# Patient Record
Sex: FEMALE | Race: WHITE
[De-identification: names, ages, dates, MRNs, and addresses within clinical notes are randomized per-mention and may not be internally consistent; named-entity substitution may affect disease eponyms.]

---

## 2018-03-21 ENCOUNTER — HOSPITAL ENCOUNTER (EMERGENCY)
Dept: HOSPITAL 45 - C.EDB | Age: 23
Discharge: HOME | End: 2018-03-21
Payer: COMMERCIAL

## 2018-03-21 VITALS — OXYGEN SATURATION: 98 % | SYSTOLIC BLOOD PRESSURE: 120 MMHG | DIASTOLIC BLOOD PRESSURE: 87 MMHG | HEART RATE: 68 BPM

## 2018-03-21 VITALS
WEIGHT: 171.3 LBS | BODY MASS INDEX: 25.96 KG/M2 | BODY MASS INDEX: 25.96 KG/M2 | WEIGHT: 171.3 LBS | HEIGHT: 67.99 IN | HEIGHT: 67.99 IN

## 2018-03-21 VITALS — TEMPERATURE: 97.88 F

## 2018-03-21 DIAGNOSIS — R10.9: ICD-10-CM

## 2018-03-21 DIAGNOSIS — Z79.3: ICD-10-CM

## 2018-03-21 DIAGNOSIS — M54.5: Primary | ICD-10-CM

## 2018-03-21 DIAGNOSIS — K59.00: ICD-10-CM

## 2018-03-21 LAB
ALBUMIN SERPL-MCNC: 3.7 GM/DL (ref 3.4–5)
ALP SERPL-CCNC: 53 U/L (ref 45–117)
ALT SERPL-CCNC: 16 U/L (ref 12–78)
AST SERPL-CCNC: 18 U/L (ref 15–37)
BASOPHILS # BLD: 0.04 K/UL (ref 0–0.2)
BASOPHILS NFR BLD: 0.6 %
BUN SERPL-MCNC: 18 MG/DL (ref 7–18)
CALCIUM SERPL-MCNC: 8.9 MG/DL (ref 8.5–10.1)
CO2 SERPL-SCNC: 24 MMOL/L (ref 21–32)
CREAT SERPL-MCNC: 1 MG/DL (ref 0.6–1.2)
EOS ABS #: 0.11 K/UL (ref 0–0.5)
EOSINOPHIL NFR BLD AUTO: 408 K/UL (ref 130–400)
GLUCOSE SERPL-MCNC: 98 MG/DL (ref 70–99)
HCT VFR BLD CALC: 40.9 % (ref 37–47)
HGB BLD-MCNC: 14 G/DL (ref 12–16)
IG#: 0.01 K/UL (ref 0–0.02)
IMM GRANULOCYTES NFR BLD AUTO: 26.9 %
LIPASE: 200 U/L (ref 73–393)
LYMPHOCYTES # BLD: 1.94 K/UL (ref 1.2–3.4)
MCH RBC QN AUTO: 32.3 PG (ref 25–34)
MCHC RBC AUTO-ENTMCNC: 34.2 G/DL (ref 32–36)
MCV RBC AUTO: 94.5 FL (ref 80–100)
MONO ABS #: 0.3 K/UL (ref 0.11–0.59)
MONOCYTES NFR BLD: 4.2 %
NEUT ABS #: 4.81 K/UL (ref 1.4–6.5)
NEUTROPHILS # BLD AUTO: 1.5 %
NEUTROPHILS NFR BLD AUTO: 66.7 %
PMV BLD AUTO: 9.8 FL (ref 7.4–10.4)
POTASSIUM SERPL-SCNC: 4.1 MMOL/L (ref 3.5–5.1)
PROT SERPL-MCNC: 7.2 GM/DL (ref 6.4–8.2)
RED CELL DISTRIBUTION WIDTH CV: 12.9 % (ref 11.5–14.5)
RED CELL DISTRIBUTION WIDTH SD: 45.3 FL (ref 36.4–46.3)
SODIUM SERPL-SCNC: 137 MMOL/L (ref 136–145)
WBC # BLD AUTO: 7.21 K/UL (ref 4.8–10.8)

## 2018-03-21 NOTE — DIAGNOSTIC IMAGING REPORT
ABD/PELVIS WITHOUT FOR STONE



CLINICAL HISTORY: 22 years-old Female presenting with eval for left flank pain,

stone, dark stool, nausea. 



TECHNIQUE: Multidetector CT of the abdomen and pelvis was performed without the

use of intravenous contrast. IV contrast: None. A dose lowering technique was

used consistent with the principles of ALARA (as low as reasonably achievable). 



COMPARISON: None.



CT DOSE (mGy.cm): The estimated cumulative dose is 877.47 mGy.cm.



FINDINGS:



 topogram: Unremarkable.



Lung bases: Lungs and pleural spaces clear. Normal heart size. No pericardial or

pleural effusion. 



Liver: Normal morphology. Normal density.



Biliary: No gross biliary ductal dilatation allowing for noncontrast technique.

Normal gallbladder.



Pancreas: Normal noncontrast appearance.



Spleen: Normal noncontrast appearance.



Adrenal glands: Normal noncontrast appearance.



Kidneys and ureters: No nephrolithiasis. Duplicated bilateral renal collecting

systems. No ureteral calculi. 



Bladder: Normal.



Pelvic organs: Normal noncontrast appearance.



Bowel: Normal appendix. No bowel obstruction.



Peritoneal cavity: No free fluid or intraperitoneal gas.



Lymph nodes: No gross lymphadenopathy allowing for noncontrast technique.



Vasculature: Normal noncontrast appearance.



Abdominal wall: Normal.



Musculoskeletal: Normal.



IMPRESSION:

1.  No nephrolithiasis or hydronephrosis. Duplicated bilateral renal collecting

systems. No convincing evidence of acute intra-abdominal pathology allowing for

noncontrast technique.











Electronically signed by:  Pan Bass M.D.

3/21/2018 1:23 PM



Dictated Date/Time:  3/21/2018 1:18 PM

## 2018-03-21 NOTE — EMERGENCY ROOM VISIT NOTE
History


Report prepared by Hallie:  Hugh Ramon


Under the Supervision of:  Dr. Kiko Dickinson M.D.


First contact with patient:  12:00


Chief Complaint:  ABDOMINAL PAIN


Stated Complaint:  BLACK STOOL, BACK PAIN, ABDOMINAL PAIN, HOT FLASH


Nursing Triage Summary:  


dark stool, severe left flank pain, abd pain 





nausea 





no vomiting





History of Present Illness


The patient is a 22 year old female who presents to the Emergency Room with 

complaints of persistent upper abdominal pain since yesterday. She currently 

rates her pain an 8/10 in severity. She reports nausea. She also reports low 

back pain that has been ongoing for two weeks. She denies any sick contacts, 

trauma, or injury. She notes the back pain is now radiating to her upper back. 

She has tried Advil, [place], and tums, though she has not taken any 

medication for the pain today. She has been to chiropractors with no 

significant relief. She notes black stools. She denies taking any iron 

supplements. She denies any history of black stools in the past. She denies any 

chest pain, shortness of breath, and urinary symptoms. She denies any abnormal 

vaginal discharge or bleeding. She denies any other underlying medical problems 

or surgeries. She denies any chance of pregnancy. She is currently on birth 

control. She reports stress from school.





   Source of History:  patient


   Onset:  since yesterday


   Position:  abdomen (upper)


   Symptom Intensity:  8/10


   Timing:  other (persistent)


   Associated Symptoms:  + back pain, No chest pain, No SOB, No urinary symptoms


Note:


She notes black stools. 


She denies any sick contacts, trauma, or injury.


She denies any abnormal vaginal discharge or bleeding.





Review of Systems


See HPI for pertinent positives & negatives. A total of 10 systems reviewed and 

were otherwise negative.





Past Medical & Surgical


Medical Problems:


(1) No Known Active Medical Problems





Old medical records were reviewed. Nurse's notes were reviewed and I agree with.





Family History





FHx: cancer


Hypertension


Kidney disease


Kidney stones





Social History


Smoking Status:  Never Smoker


Smokeless Tobacco Use:  No


Alcohol Use:  occasionally (2 per week)


Drug Use:  none


Marital Status:  single


Housing Status:  lives with roommate


Occupation Status:  unemployed





Current/Historical Medications


Scheduled


Birth Control Pills (Birth Control Pills), 1 TAB PO DAILY


Lurasidone Hcl (Latuda), 40 MG PO DAILY





Allergies


Coded Allergies:  


     No Known Allergies (Unverified , 3/21/18)





Physical Exam


Vital Signs











  Date Time  Temp Pulse Resp B/P (MAP) Pulse Ox O2 Delivery O2 Flow Rate FiO2


 


3/21/18 13:31  68 18 120/87 98 Room Air  


 


3/21/18 12:50  68 18 110/60 98 Room Air  


 


3/21/18 11:26 36.6 76 20 116/80 96 Room Air  











Physical Exam


General: Non-ill appearing young female in no acute distress. 


HEENT: Normal cephalic atraumatic.  Pupils are equal round and reactive to 

light.  Extraocular movements are intact.  Oropharynx is pink with moist mucous 

membranes.  No swelling of the mouth lips or tongue.


Neck: Supple with a midline trachea.  No meningeal signs or stiffness, no JVD 

or bruits. No Stridor.


Chest: Clear to auscultation bilaterally.  No wheezes or rhonchi.  No increased 

work of breathing.


Heart: regular rate and rhythm. 


Abdomen: Soft, minimally tender in central abdomen, nondistended without 

rebound guarding or rigidity.  


Extremities: No cyanosis clubbing or edema. No calf tenderness or assymetry


Spine/Back. Non tender to palpation. No CVA tenderness


Rectal: (in presence of female nurse chaperone) Normal sensation in perirectal 

area, no masses. Normal rectal tone. Stool was dark, though guaiac negative. 


Skin: Good turgor without rashes.


Neurologic exam: Cranial nerves two through 12 are intact.  Motor and sensation 

are intact and symmetrical throughout.





Medical Decision & Procedures


ER Provider


Diagnostic Interpretation:


Radiology results as stated below per my review and radiologist interpretation:





ABD/PELVIS WITHOUT FOR STONE





CLINICAL HISTORY: 22 years-old Female presenting with eval for left flank pain,


stone, dark stool, nausea. 





TECHNIQUE: Multidetector CT of the abdomen and pelvis was performed without the


use of intravenous contrast. IV contrast: None. A dose lowering technique was


used consistent with the principles of ALARA (as low as reasonably achievable). 





COMPARISON: None.





CT DOSE (mGy.cm): The estimated cumulative dose is 877.47 mGy.cm.





FINDINGS:





 topogram: Unremarkable.





Lung bases: Lungs and pleural spaces clear. Normal heart size. No pericardial or


pleural effusion. 





Liver: Normal morphology. Normal density.





Biliary: No gross biliary ductal dilatation allowing for noncontrast technique.


Normal gallbladder.





Pancreas: Normal noncontrast appearance.





Spleen: Normal noncontrast appearance.





Adrenal glands: Normal noncontrast appearance.





Kidneys and ureters: No nephrolithiasis. Duplicated bilateral renal collecting


systems. No ureteral calculi. 





Bladder: Normal.





Pelvic organs: Normal noncontrast appearance.





Bowel: Normal appendix. No bowel obstruction.





Peritoneal cavity: No free fluid or intraperitoneal gas.





Lymph nodes: No gross lymphadenopathy allowing for noncontrast technique.





Vasculature: Normal noncontrast appearance.





Abdominal wall: Normal.





Musculoskeletal: Normal.





IMPRESSION:


1.  No nephrolithiasis or hydronephrosis. Duplicated bilateral renal collecting


systems. No convincing evidence of acute intra-abdominal pathology allowing for


noncontrast technique.

















Electronically signed by:  Pan Bass M.D.


3/21/2018 1:23 PM





Dictated Date/Time:  3/21/2018 1:18 PM





Laboratory Results


3/21/18 11:51








Red Blood Count 4.33, Mean Corpuscular Volume 94.5, Mean Corpuscular Hemoglobin 

32.3, Mean Corpuscular Hemoglobin Concent 34.2, Mean Platelet Volume 9.8, 

Neutrophils (%) (Auto) 66.7, Lymphocytes (%) (Auto) 26.9, Monocytes (%) (Auto) 

4.2, Eosinophils (%) (Auto) 1.5, Basophils (%) (Auto) 0.6, Neutrophils # (Auto) 

4.81, Lymphocytes # (Auto) 1.94, Monocytes # (Auto) 0.30, Eosinophils # (Auto) 

0.11, Basophils # (Auto) 0.04





3/21/18 11:51

















Test


  3/21/18


11:44 3/21/18


11:51


 


Urine Color YELLOW  


 


Urine Appearance CLEAR (CLEAR)  


 


Urine pH 5.5 (4.5-7.5)  


 


Urine Specific Gravity


  1.007


(1.000-1.030) 


 


 


Urine Protein NEG (NEG)  


 


Urine Glucose (UA) NEG (NEG)  


 


Urine Ketones NEG (NEG)  


 


Urine Occult Blood NEG (NEG)  


 


Urine Nitrite NEG (NEG)  


 


Urine Bilirubin NEG (NEG)  


 


Urine Urobilinogen NEG (NEG)  


 


Urine Leukocyte Esterase NEG (NEG)  


 


Urine Pregnancy Test NEG (NEG)  


 


White Blood Count


  


  7.21 K/uL


(4.8-10.8)


 


Red Blood Count


  


  4.33 M/uL


(4.2-5.4)


 


Hemoglobin


  


  14.0 g/dL


(12.0-16.0)


 


Hematocrit  40.9 % (37-47) 


 


Mean Corpuscular Volume


  


  94.5 fL


()


 


Mean Corpuscular Hemoglobin


  


  32.3 pg


(25-34)


 


Mean Corpuscular Hemoglobin


Concent 


  34.2 g/dl


(32-36)


 


Platelet Count


  


  408 K/uL


(130-400)


 


Mean Platelet Volume


  


  9.8 fL


(7.4-10.4)


 


Neutrophils (%) (Auto)  66.7 % 


 


Lymphocytes (%) (Auto)  26.9 % 


 


Monocytes (%) (Auto)  4.2 % 


 


Eosinophils (%) (Auto)  1.5 % 


 


Basophils (%) (Auto)  0.6 % 


 


Neutrophils # (Auto)


  


  4.81 K/uL


(1.4-6.5)


 


Lymphocytes # (Auto)


  


  1.94 K/uL


(1.2-3.4)


 


Monocytes # (Auto)


  


  0.30 K/uL


(0.11-0.59)


 


Eosinophils # (Auto)


  


  0.11 K/uL


(0-0.5)


 


Basophils # (Auto)


  


  0.04 K/uL


(0-0.2)


 


RDW Standard Deviation


  


  45.3 fL


(36.4-46.3)


 


RDW Coefficient of Variation


  


  12.9 %


(11.5-14.5)


 


Immature Granulocyte % (Auto)  0.1 % 


 


Immature Granulocyte # (Auto)


  


  0.01 K/uL


(0.00-0.02)


 


Anion Gap


  


  7.0 mmol/L


(3-11)


 


Est Creatinine Clear Calc


Drug Dose 


  98.3 ml/min 


 


 


Estimated GFR (


American) 


  93.9 


 


 


Estimated GFR (Non-


American 


  81.0 


 


 


BUN/Creatinine Ratio  18.2 (10-20) 


 


Calcium Level


  


  8.9 mg/dl


(8.5-10.1)


 


Total Bilirubin


  


  0.5 mg/dl


(0.2-1)


 


Direct Bilirubin


  


  < 0.1 mg/dl


(0-0.2)


 


Aspartate Amino Transf


(AST/SGOT) 


  18 U/L (15-37) 


 


 


Alanine Aminotransferase


(ALT/SGPT) 


  16 U/L (12-78) 


 


 


Alkaline Phosphatase


  


  53 U/L


()


 


Total Protein


  


  7.2 gm/dl


(6.4-8.2)


 


Albumin


  


  3.7 gm/dl


(3.4-5.0)


 


Lipase


  


  200 U/L


()





Laboratory studies as stated above per my review.





Medications Administered











 Medications


  (Trade)  Dose


 Ordered  Sig/Kaden


 Route  Start Time


 Stop Time Status Last Admin


Dose Admin


 


 Sodium Chloride  1,000 ml @ 


 999 mls/hr  Q1H1M STAT


 IV  3/21/18 12:06


 3/21/18 13:06 DC 3/21/18 12:30


999 MLS/HR


 


 Sodium Chloride  1,000 ml @ 


 150 mls/hr  Q6H40M ONCE


 IV  3/21/18 12:06


 3/21/18 18:45  3/21/18 12:06


150 MLS/HR


 


 Ondansetron HCl


  (Zofran Inj)  4 mg  NOW  STAT


 IV  3/21/18 12:06


 3/21/18 12:09 DC 3/21/18 12:30


4 MG


 


 Morphine Sulfate


  (MoRPHine


 SULFATE INJ)  2 mg  NOW  STAT


 IV  3/21/18 12:37


 3/21/18 12:38 DC 3/21/18 12:50


2 MG


 


 Morphine Sulfate


  (MoRPHine


 SULFATE INJ)  4 mg  NOW  STAT


 IV  3/21/18 13:31


 3/21/18 13:32 DC 3/21/18 13:38


4 MG











ED Course


1203: Past medical records reviewed. The patient was evaluated in room C1B, and 

a complete history and physical examination were performed.





1206: Ordered Zofran 4 mg IV, Sodium Chloride 1,000 ml @ 150 mls/hr IV, and  

Sodium Chloride 1,000 ml @ 999 mls/hr IV





1233: I reassessed the patient at this time. The patient is in pain. She agreed 

to have a CT scan.





1237: Ordered Morphine Sulfate 2 mg IV





1331: Ordered Morphine Sulfate 4 mg IV





1358: I reassessed the patient at this time. I performed a rectal exam. Please 

see exam note. I discussed the results and treatment plan with the patient. I 

answered all pertaining questions that she had. She expressed understanding and 

verbalized agreement. The patient will be discharged home.





Medical Decision


Differentials include, but are not limited to: GI bleed, gastritis, infection, 

electrolyte or metabolic abnormality, pregnancy, kidney disease, and trauma.





This patient comes in as described above.  She has abdominal pain she also had 

back pain for several weeks preceding the abdominal pain.  She says her stool 

was black this morning.  She is hemodynamically stable. on exam, she has 

minimal tenderness and no peritonitis.  She has been taking NSAIDs although not 

large amounts.  IV access was established and she was hydrated with IV normal 

saline.  Multiple blood testing was obtained.  Urinalysis was ordered as well.  

Her pregnancy test was negative.  No white count or fever to suggest infection.

  She has no acute electrolyte or metabolic abnormalities.  She has nothing to 

suggest liver gallbladder or pancreas disease.  Urinalysis was unremarkable.  

CAT scan the abdomen shows no acute intra-abdominal findings and no obstructive 

uropathy.  She attempted to give a stool sample and started having pain 

afterwards.  She was constipated yesterday apparently.  Her stools been dark as 

she has been on Pepto-Bismol is the most likely cause.  I did a rectal exam and 

she has normal tone and no evidence of any neurologic deficits.  She has dark 

stool habits guaiac negative and is likely dark from the Pepto-Bismol rather 

than blood.  Hemoglobin is been stable.  I will have her rest and use ibuprofen 

for pain.  While she was here she did receive IV Toradol as well as IV morphine 

2 and IV Zofran. she seems to be doing a lot better she was hydrated with IV 

normal saline.  I recommend she follow-up with [facility] next 

couple days and use a stool softener return to the ER if: Increasing pain, 

fever or chills, worsening of symptoms, any new problems or concerns.  She was 

happy with the plan and discharged to home.





Medication Reconcilliation


Current Medication List:  was personally reviewed by me





Blood Pressure Screening


Patient's blood pressure:  Normal blood pressure





Impression





 Primary Impression:  


 Low back pain


 Additional Impressions:  


 Diffuse abdominal pain


 Constipation





Scribe Attestation


The scribe's documentation has been prepared under my direction and personally 

reviewed by me in its entirety. I confirm that the note above accurately 

reflects all work, treatment, procedures, and medical decision making performed 

by me.





Departure Information


Dispostion


Home / Self-Care





Referrals


No Doctor, Assigned (PCP)





Forms


HOME CARE DOCUMENTATION FORM,                                                 

               IMPORTANT VISIT INFORMATION





Patient Instructions


My [facility]





Additional Instructions





Rest.  Drink plenty of fluids.  Use a stool softener if needed





Use ibuprofen 400 mg every 6 hours, take with food





Return if: Increasing pain, fever or chills, worsening of symptoms, 

lightheadedness or dizziness, any new problems or concerns





Follow-up with the [facility] the next 1-2 days if not better or 

return to the ER any point if symptoms worsen





Problem Qualifiers